# Patient Record
Sex: MALE | Race: WHITE
[De-identification: names, ages, dates, MRNs, and addresses within clinical notes are randomized per-mention and may not be internally consistent; named-entity substitution may affect disease eponyms.]

---

## 2017-12-29 ENCOUNTER — APPOINTMENT (OUTPATIENT)
Dept: ORTHOPEDIC SURGERY | Facility: CLINIC | Age: 47
End: 2017-12-29
Payer: COMMERCIAL

## 2017-12-29 VITALS
HEIGHT: 65 IN | SYSTOLIC BLOOD PRESSURE: 113 MMHG | WEIGHT: 150 LBS | BODY MASS INDEX: 24.99 KG/M2 | DIASTOLIC BLOOD PRESSURE: 72 MMHG | HEART RATE: 58 BPM

## 2017-12-29 DIAGNOSIS — M54.5 LOW BACK PAIN: ICD-10-CM

## 2017-12-29 DIAGNOSIS — M54.9 DORSALGIA, UNSPECIFIED: ICD-10-CM

## 2017-12-29 DIAGNOSIS — M50.20 OTHER CERVICAL DISC DISPLACEMENT, UNSPECIFIED CERVICAL REGION: ICD-10-CM

## 2017-12-29 DIAGNOSIS — Z87.891 PERSONAL HISTORY OF NICOTINE DEPENDENCE: ICD-10-CM

## 2017-12-29 DIAGNOSIS — Z87.39 PERSONAL HISTORY OF OTHER DISEASES OF THE MUSCULOSKELETAL SYSTEM AND CONNECTIVE TISSUE: ICD-10-CM

## 2017-12-29 DIAGNOSIS — Z80.9 FAMILY HISTORY OF MALIGNANT NEOPLASM, UNSPECIFIED: ICD-10-CM

## 2017-12-29 PROCEDURE — 99204 OFFICE O/P NEW MOD 45 MIN: CPT

## 2017-12-29 PROCEDURE — 72170 X-RAY EXAM OF PELVIS: CPT | Mod: 59

## 2017-12-29 PROCEDURE — 72110 X-RAY EXAM L-2 SPINE 4/>VWS: CPT

## 2017-12-29 PROCEDURE — 72070 X-RAY EXAM THORAC SPINE 2VWS: CPT

## 2017-12-29 NOTE — DISCUSSION/SUMMARY
[Medication Risks Reviewed] : Medication risks reviewed [de-identified] : The patient presents with chronic history of low back pain. There appears to be a chronic compression deformity of the vertebral body with degenerative changes at the L4-5 level which is likely contributing to his chronic back pain. He occasionally reports pain radiating to his hips. I discussed the option of getting an MRI lumbar spine but he would like to avoid that for now.\par \par The patient also presents with a more recent acute injury to his thoracolumbar junction when diving off a springboard. Suggestion of avulsion injury of the inferior endplate of L1 which is likely contributing to his symptoms. He's not interested in MRI for further investigation at this time.\par \par I discussed the option of physical therapy but he feels that his copayments are too high and he is not interested in those I recommended that he avoid impact exercises in the lower extremities also bending twisting lifting. I discussed alternative treatments of acupuncture as well. He is not interested in those at the moment. He may try over-the-counter patches and creams.\par \par The patient was educated regarding their condition, treatment options as well as prescribed course of treatment. \par Risks and benefits as well as alternatives to the proposed treatment were also provided to the patient \par They were given the opportunity to have all their questions answered to their satisfaction.\par \par Vital signs were reviewed with the patient and the patient was instructed to followup with their primary care provider for further management.\par \par Healthy lifestyle recommendations were also made including a tobacco free lifestyle, proper diet, and weight control.

## 2017-12-29 NOTE — CONSULT LETTER
[Dear  ___] : Dear  [unfilled], [I had the pleasure of evaluating your patient, [unfilled].] : I had the pleasure of evaluating your patient, [unfilled]. [FreeTextEntry2] : Mert\LILLY Olvera [FreeTextEntry1] : Thank you for this referral. I have enclosed my note for your review. Please feel free to contact my office if you have additional questions regarding this patient.\par \par Regards,\par Shola Barker MD, FACS, FAAOS\par \par  of Orthopaedic Surgery\par [facility] [employer]\par Spinal Reconstruction Surgery\par Minimally Invasive Spinal Surgery\par [facility]

## 2017-12-29 NOTE — HISTORY OF PRESENT ILLNESS
[Prolonged Sitting] : worsened by prolonged sitting [Sitting] : worsened by sitting [Physical Therapy] : relieved by physical therapy [Recumbency] : relieved by recumbency [Rest] : relieved by rest [Joint Pain] : joint pain [Joint Stiffness] : joint stiffness [Sleep Disturbances] : sleep disturbances [All Other ROS Normal] : All other review of systems are negative except as noted [All Hx] : past medical, family, and social [All] : medication and allergy [Pain] : pain [Stable] : stable [___ mths] : [unfilled] month(s) ago [Chills] : no chills [Fever] : no fever [FreeTextEntry1] : Mid to low back pain [FreeTextEntry2] : Patient is here today for evaluation on his mid back pain since diving in a pool 6 months ago over a springboard. Had pain over thoracolumbar junction for a few weeks, no bruising. Improved over time. Not medically treated for this issue. \par Patient also here for evaluation on his low back pain going on for 5 years. Patient states in the past has seen orthopedist for his neck and low back pain. Patient at that time did go for physical therapy for his neck which did help no real neck pain anymore but still gets on and off low back pain. Patient is  and does sit a lot.\par Neck pain ~8 years ago, saw an orthopaedist, MRI at that time, PT at that time.

## 2017-12-29 NOTE — PHYSICAL EXAM
[Poor Appearance] : well-appearing [Acute Distress] : not in acute distress [Obese] : not obese [Abl Mood] : in a normal mood [Abl Affect] : with normal affect [Poor Coordination] : normal coordination [Disorientation] : oriented x 3 [Normal] : normal [Limited] : is limited [Painful] : not painful [SLR] : negative straight leg raise [LE] : Sensory: Intact in bilateral lower extremities [1+] : left patella 1+ [0] : left ankle jerk 0 [Plantar Reflex Right Only] : absent on the right [Plantar Reflex Left Only] : absent on the left [DTR Reflexes Clonus Of Right Ankle (___ Beats)] : absent on the right [DTR Reflexes Clonus Of Left Ankle (___ Beats)] : absent on the left [DP] : dorsalis pedis 2+ and symmetric bilaterally [PT] : posterior tibial 2+ and symmetric bilaterally [FreeTextEntry2] : The pt is awake, alert and oriented to self, place and time, is comfortable and in no acute distress. Gait examination reveals a narrow based, non-ataxic, non-antalgic gait. The pt can heel and toe walk without difficulty. No rashes or ecchymotic lesions noted over the neck, back and lower extremities bilaterally. No obvious abnormal spinal curvature in the sagittal and coronal planes. No tenderness over the cervical, thoracic or lumbar spine, paraspinal or upper and lower extremity musculature. There is no sacroiliac tenderness bilaterally. No tenderness over the greater trochanter bilaterally. No atrophy or abnormal movements noted in the upper or lower extremities bilaterally. No swelling seen in the upper or lower extremities bilaterally. No joint laxity noted in the upper and lower extremity joints bilaterally.\par No cervical lymphadenopathy noted anteriorly. \par Cervical spine range of motion is pain free. Forward flexion of chin to chest, extension 30 degrees, rotation laterally 40 degrees bilaterally. Full range of motion of both shoulders. Negative Spurling's sign bilaterally. There is a negative Neer's sign and Hawkin's sign bilaterally. \par Range of motion of hip is internal rotation 20 degrees,  30° external rotation without pain\par Negative straight leg raise to 60° in the supine position. No groin pain with hip internal rotation, negative LEXIE test bilaterally. There are 2+ DP pulses bilaterally. There is a negative Babinski sign and no clonus bilaterally in the upper or lower extremities. [de-identified] : He can forward flex to his mid tibia and extend 30° with no significant pain [de-identified] : 4 views lumbar spine obtained today demonstrate minimal right-sided thoracolumbar curve. On the lateral projection chronic severe decompression from the scene of the L5 vertebral body with approximately 10% loss of vertebral body height. Degenerative changes are seen with a true osteophyte at the L5 level. Between flex extension no dynamic instability. There is suggestion of avulsion injury of the inferior endplate of L1 vertebral body.\par \par AP and lateral image of the thoracic spine demonstrates no significant scoliosis. No obvious acute fractures. No significant degenerative changes.\par \par AP pelvis demonstrates normal appearance of the hips bilaterally. No acute fractures but no significant degeneration.

## 2024-07-14 ENCOUNTER — EMERGENCY (EMERGENCY)
Facility: HOSPITAL | Age: 54
LOS: 0 days | Discharge: HOME | End: 2024-07-14
Attending: EMERGENCY MEDICINE
Payer: COMMERCIAL

## 2024-07-14 VITALS
TEMPERATURE: 98 F | HEART RATE: 65 BPM | SYSTOLIC BLOOD PRESSURE: 123 MMHG | DIASTOLIC BLOOD PRESSURE: 79 MMHG | RESPIRATION RATE: 16 BRPM | OXYGEN SATURATION: 98 %

## 2024-07-14 DIAGNOSIS — E78.00 PURE HYPERCHOLESTEROLEMIA, UNSPECIFIED: ICD-10-CM

## 2024-07-14 DIAGNOSIS — M54.50 LOW BACK PAIN, UNSPECIFIED: ICD-10-CM

## 2024-07-14 DIAGNOSIS — M48.00 SPINAL STENOSIS, SITE UNSPECIFIED: ICD-10-CM

## 2024-07-14 DIAGNOSIS — M53.3 SACROCOCCYGEAL DISORDERS, NOT ELSEWHERE CLASSIFIED: ICD-10-CM

## 2024-07-14 PROCEDURE — 96372 THER/PROPH/DIAG INJ SC/IM: CPT

## 2024-07-14 PROCEDURE — 99284 EMERGENCY DEPT VISIT MOD MDM: CPT

## 2024-07-14 PROCEDURE — 99283 EMERGENCY DEPT VISIT LOW MDM: CPT | Mod: 25

## 2024-07-14 RX ORDER — PREDNISONE 10 MG/1
40 TABLET ORAL ONCE
Refills: 0 | Status: COMPLETED | OUTPATIENT
Start: 2024-07-14 | End: 2024-07-14

## 2024-07-14 RX ORDER — ACETAMINOPHEN 325 MG
1000 TABLET ORAL ONCE
Refills: 0 | Status: DISCONTINUED | OUTPATIENT
Start: 2024-07-14 | End: 2024-07-14

## 2024-07-14 RX ORDER — KETOROLAC TROMETHAMINE 30 MG/ML
30 INJECTION, SOLUTION INTRAMUSCULAR ONCE
Refills: 0 | Status: DISCONTINUED | OUTPATIENT
Start: 2024-07-14 | End: 2024-07-14

## 2024-07-14 RX ORDER — PREDNISONE 10 MG/1
2 TABLET ORAL
Qty: 8 | Refills: 0
Start: 2024-07-14 | End: 2024-07-17

## 2024-07-14 RX ORDER — LIDOCAINE HCL 28 MG/G
1 GEL TOPICAL ONCE
Refills: 0 | Status: COMPLETED | OUTPATIENT
Start: 2024-07-14 | End: 2024-07-14

## 2024-07-14 RX ADMIN — PREDNISONE 40 MILLIGRAM(S): 10 TABLET ORAL at 10:56

## 2024-07-14 RX ADMIN — LIDOCAINE HCL 1 PATCH: 28 GEL TOPICAL at 10:56

## 2024-07-14 RX ADMIN — KETOROLAC TROMETHAMINE 30 MILLIGRAM(S): 30 INJECTION, SOLUTION INTRAMUSCULAR at 10:56

## 2024-07-14 RX ADMIN — Medication 10 MILLIGRAM(S): at 10:56

## 2024-07-22 ENCOUNTER — APPOINTMENT (OUTPATIENT)
Age: 54
End: 2024-07-22
Payer: COMMERCIAL

## 2024-07-22 ENCOUNTER — TRANSCRIPTION ENCOUNTER (OUTPATIENT)
Age: 54
End: 2024-07-22

## 2024-07-22 ENCOUNTER — APPOINTMENT (OUTPATIENT)
Dept: RADIOLOGY | Facility: CLINIC | Age: 54
End: 2024-07-22
Payer: COMMERCIAL

## 2024-07-22 VITALS
OXYGEN SATURATION: 97 % | HEART RATE: 81 BPM | WEIGHT: 160 LBS | DIASTOLIC BLOOD PRESSURE: 75 MMHG | SYSTOLIC BLOOD PRESSURE: 115 MMHG | BODY MASS INDEX: 26.66 KG/M2 | HEIGHT: 65 IN

## 2024-07-22 DIAGNOSIS — M54.50 LOW BACK PAIN, UNSPECIFIED: ICD-10-CM

## 2024-07-22 DIAGNOSIS — M25.551 PAIN IN RIGHT HIP: ICD-10-CM

## 2024-07-22 DIAGNOSIS — M54.10 RADICULOPATHY, SITE UNSPECIFIED: ICD-10-CM

## 2024-07-22 PROCEDURE — 72110 X-RAY EXAM L-2 SPINE 4/>VWS: CPT | Mod: 26

## 2024-07-22 PROCEDURE — 73502 X-RAY EXAM HIP UNI 2-3 VIEWS: CPT | Mod: 26,RT

## 2024-07-22 PROCEDURE — 99203 OFFICE O/P NEW LOW 30 MIN: CPT

## 2024-07-23 PROBLEM — M54.50 LOW BACK PAIN: Status: ACTIVE | Noted: 2017-12-29

## 2024-07-23 RX ORDER — CYCLOBENZAPRINE HYDROCHLORIDE 5 MG/1
5 TABLET, FILM COATED ORAL 3 TIMES DAILY
Qty: 30 | Refills: 0 | Status: ACTIVE | COMMUNITY
Start: 2024-07-23 | End: 1900-01-01

## 2024-07-23 NOTE — CONSULT LETTER
[Dear  ___] : Dear  [unfilled], [Courtesy Letter:] : I had the pleasure of seeing your patient, [unfilled], in my office today. [Sincerely,] : Sincerely, [FreeTextEntry1] : Zack Geller is a 54-year-old  who presents today with lumbar symptoms.  Patient reports symptoms started approximately 20 years ago without any specific event.  Patient reports an intermittent ache to the right side of his lower back region.  He reports he also experiences a shooting pain which radiates from the right side of his lower back into his right hip.  He occasionally experiences a right groin ache.  Patient reports at times he will experience an ache radiating down into his right leg.  He is unsure of the specific distribution.  He reports numbness and tingling to the left lateral hip and thigh and right lateral leg.  He denies lower extremity weakness.  Denies saddle anesthesia or bowel bladder dysfunction.  He reports initially he was having difficulties bearing weight on his right leg however this has improved.  Patient was evaluated at the emergency room on 7/14/2024.  He was prescribed prednisone and Flexeril.  Patient reports Advil provides him with some benefit.  He has attempted Tylenol.  He had underwent physical therapy for 2 months approximately 1 year ago.  He reports stretching provides him with some benefit.  Patient is alert and oriented.  No distress noted.  Negative Eileen's.  Negative clonus.  Patient is able to perform heel and toe raises without difficulties.  He is able to tandem walk without difficulties.  No difficulties noted with normal walking.  Strength to bilateral upper and lower extremities 5/5.  Diminished upper extremity reflexes.  2+ bilateral patellar reflexes.  Unable to elicit bilateral Achilles tendon reflexes.  I provided the patient with a prescription for an x-ray of his right hip and lumbar spine.  Provided patient with prescription for MRI of the lumbar spine.  Provided patient with referral for pain management.  Provided patient with refill for Flexeril.  Patient aware not to drive or fly while on medication.  Risks and SE reviewed with patient.  Patient verbalizes understanding and risks.  Patient will follow-up in office to review images with neurosurgeon.  Patient aware to call with any further questions or concerns or with any new or worsening symptoms. [FreeTextEntry3] : Lainey Herring, MSN, [facility]-BC Nurse Practitioner Neurosurgery [address], [address] [place] [address]  Office: [phone]  Fax: [phone]

## 2024-07-23 NOTE — CONSULT LETTER
[Dear  ___] : Dear  [unfilled], [Courtesy Letter:] : I had the pleasure of seeing your patient, [unfilled], in my office today. [Sincerely,] : Sincerely, [FreeTextEntry1] : Zack Geller is a 54-year-old  who presents today with lumbar symptoms.  Patient reports symptoms started approximately 20 years ago without any specific event.  Patient reports an intermittent ache to the right side of his lower back region.  He reports he also experiences a shooting pain which radiates from the right side of his lower back into his right hip.  He occasionally experiences a right groin ache.  Patient reports at times he will experience an ache radiating down into his right leg.  He is unsure of the specific distribution.  He reports numbness and tingling to the left lateral hip and thigh and right lateral leg.  He denies lower extremity weakness.  Denies saddle anesthesia or bowel bladder dysfunction.  He reports initially he was having difficulties bearing weight on his right leg however this has improved.  Patient was evaluated at the emergency room on 7/14/2024.  He was prescribed prednisone and Flexeril.  Patient reports Advil provides him with some benefit.  He has attempted Tylenol.  He had underwent physical therapy for 2 months approximately 1 year ago.  He reports stretching provides him with some benefit.  Patient is alert and oriented.  No distress noted.  Negative Eileen's.  Negative clonus.  Patient is able to perform heel and toe raises without difficulties.  He is able to tandem walk without difficulties.  No difficulties noted with normal walking.  Strength to bilateral upper and lower extremities 5/5.  Diminished upper extremity reflexes.  2+ bilateral patellar reflexes.  Unable to elicit bilateral Achilles tendon reflexes.  I provided the patient with a prescription for an x-ray of his right hip and lumbar spine.  Provided patient with prescription for MRI of the lumbar spine.  Provided patient with referral for pain management.  Provided patient with refill for Flexeril.  Patient aware not to drive or fly while on medication.  Risks and SE reviewed with patient.  Patient verbalizes understanding and risks.  Patient will follow-up in office to review images with neurosurgeon.  Patient aware to call with any further questions or concerns or with any new or worsening symptoms. [FreeTextEntry3] : Lainey Herring, MSN, [facility]-BC Nurse Practitioner Neurosurgery [address], [address] [place] [address]  Office: [phone]  Fax: [phone]

## 2024-07-30 ENCOUNTER — OUTPATIENT (OUTPATIENT)
Dept: OUTPATIENT SERVICES | Facility: HOSPITAL | Age: 54
LOS: 1 days | End: 2024-07-30
Payer: COMMERCIAL

## 2024-07-30 ENCOUNTER — APPOINTMENT (OUTPATIENT)
Dept: MRI IMAGING | Facility: CLINIC | Age: 54
End: 2024-07-30

## 2024-07-30 DIAGNOSIS — M54.50 LOW BACK PAIN, UNSPECIFIED: ICD-10-CM

## 2024-07-30 DIAGNOSIS — M25.551 PAIN IN RIGHT HIP: ICD-10-CM

## 2024-07-30 DIAGNOSIS — Z00.8 ENCOUNTER FOR OTHER GENERAL EXAMINATION: ICD-10-CM

## 2024-07-30 DIAGNOSIS — M54.10 RADICULOPATHY, SITE UNSPECIFIED: ICD-10-CM

## 2024-07-30 PROCEDURE — 72148 MRI LUMBAR SPINE W/O DYE: CPT | Mod: 26

## 2024-07-30 PROCEDURE — 72148 MRI LUMBAR SPINE W/O DYE: CPT

## 2024-08-06 ENCOUNTER — APPOINTMENT (OUTPATIENT)
Dept: PHYSICAL MEDICINE AND REHAB | Facility: CLINIC | Age: 54
End: 2024-08-06

## 2024-08-06 PROBLEM — M53.3 SACROILIAC JOINT PAIN: Status: ACTIVE | Noted: 2024-08-06

## 2024-08-06 PROCEDURE — G2211 COMPLEX E/M VISIT ADD ON: CPT | Mod: NC

## 2024-08-06 PROCEDURE — 99204 OFFICE O/P NEW MOD 45 MIN: CPT | Mod: GC

## 2024-08-06 NOTE — DATA REVIEWED
[FreeTextEntry1] : EXAM: [id number] - MR SPINE LUMBAR  - ORDERED BY: CHASTITY TANNER   PROCEDURE DATE:  07/30/2024    INTERPRETATION:  CLINICAL INFORMATION: Lower back pain  ADDITIONAL CLINICAL INFORMATION: Low back muscle strain S39.012A  TECHNIQUE: Multiplanar, multisequence MRI was performed of the lumbar spine. IV Contrast: NONE  PRIOR STUDIES: Radiographs dated 7/22/2024  FINDINGS:  LOCALIZER: No additional findings. BONES: 5 lumbar vertebral bodies are assumed. No acute fracture. ALIGNMENT: Dextrocurvature of the lumbar spine. No significant spondylolisthesis. DISCS: Mild degenerative disc disease at L3-L4, L4-L5, and L5-S1. SACROILIAC JOINTS/SACRUM: Mild degenerative changes of the superior sacroiliac joints. CONUS AND CAUDA EQUINA: Conus terminates at L1-L2. VISUALIZED INTRAPELVIC/INTRA-ABDOMINAL SOFT TISSUES: Unremarkable. PARASPINAL SOFT TISSUES: Unremarkable.  INDIVIDUAL LEVELS:  T12-L1: No canal or neuroforaminal stenosis. L1-L2: No canal or neuroforaminal stenosis. L2-L3: Small bilateral foraminal disc protrusions. No canal or neuroforaminal stenosis. L3-L4: Small disc bulge. Mild bilateral neural foraminal narrowing. No central canal stenosis. L4-L5: Disc bulge with posterior osseous ridging asymmetric to the left. Minimal bilateral facet arthrosis. Mild bilateral neural foraminal narrowing. No central canal stenosis. L5-S1: Disc bulge with posterior osseous ridging and superimposed central disc protrusion. Minimal bilateral facet arthrosis. Mild bilateral neural foraminal narrowing. No central canal stenosis.  IMPRESSION:  Multilevel degenerative changes throughout the lumbar spine, as detailed above. No significant central canal narrowing at any level. Up to mild multilevel neural foraminal narrowing, as above.  --- End of Report ---       JOSSIE HOPKINS M.D., ATTENDING RADIOLOGIST This document has been electronically signed. Aug  1 2024  8:16PM  EXAM: [id number] - XR LS SPINE FLEX EXT MIN 4V  - ORDERED BY: CHASTITY TANNER  EXAM: [id number] - XR HIP 2-3V RT  - ORDERED BY: CHASTITY TANNER   PROCEDURE DATE:  07/22/2024    INTERPRETATION:  CLINICAL INDICATION: lower back and right hip pain  EXAM: Upright AP neutral flexion extension lateral lumbosacral spine and AP frog-lateral right hip from 7/22/2024 at 1451. No similar prior studies available for comparison.  IMPRESSION: No compression fractures, spondylolistheses, spondylolysis defects, or evidence for dynamic instability.  Narrowed L4-L5 and to lesser extent L3-L4 disc spaces. Preserved remaining intervertebral disc spaces.  No right hip fracture or dislocation. Symmetrically aligned and spaced SI joints and pubic symphysis. Preserved right hip joint space and no gross radiographic evidence for AVN. Small nodular foci proximal hamstring calcific tendinosis adjacent to right ischial tuberosity margins.  No lytic or blastic lesions.  --- End of Report ---       KARI BOB MD; Attending Radiologist This document has been electronically signed. Jul 26 2024  2:07PM

## 2024-08-06 NOTE — HISTORY OF PRESENT ILLNESS
[FreeTextEntry1] : Mr. GLIDA VENEGAS is a 54-year-old male  who presents with chronic right sided low back pain.   Location: right sided low back pain Onset: Pain started intermittently over 5 years ago but within the past 2-3 years evolved into constant pain and exacerbated 2 months ago requiring emergency room visit Provocation/Palliative: Worse with activity, better with rest and lying down, sitting for long periods of time, standing for long periods of time, has since stopped working out and running;  Quality: Aching pain with intermittent sharp pains depending on position/activity Radiation: to right lateral hip and into the right groin  Severity: 4/10 constantly but can increase to 7/10 after sitting for long periods of time  Timing: constant   Denies any associated numbness. Denies any associated leg weakness. Denies any loss of bowel/bladder control or any groin numbness. Previous medications trialed: Advil, prednisone, flexeril Previous procedures relevant to complaint: nothing  Conservative therapy tried?: physical therapy with mild improvement

## 2024-08-06 NOTE — HISTORY OF PRESENT ILLNESS
[FreeTextEntry1] : Mr. GILDA VENEGAS is a 54-year-old male  who presents with chronic right sided low back pain.   Location: right sided low back pain Onset: Pain started intermittently over 5 years ago but within the past 2-3 years evolved into constant pain and exacerbated 2 months ago requiring emergency room visit Provocation/Palliative: Worse with activity, better with rest and lying down, sitting for long periods of time, standing for long periods of time, has since stopped working out and running;  Quality: Aching pain with intermittent sharp pains depending on position/activity Radiation: to right lateral hip and into the right groin  Severity: 4/10 constantly but can increase to 7/10 after sitting for long periods of time  Timing: constant   Denies any associated numbness. Denies any associated leg weakness. Denies any loss of bowel/bladder control or any groin numbness. Previous medications trialed: Advil, prednisone, flexeril Previous procedures relevant to complaint: nothing  Conservative therapy tried?: physical therapy with mild improvement

## 2024-08-06 NOTE — ASSESSMENT
[FreeTextEntry1] : Mr. GILDA VENEGAS is a 54 year old male who presents with chronic right sided low back pain likely secondary to SI joint related pain. Denies any red flag signs. Recommend at this time: -MRI L spine reviewed -initiate HEP for SI joint pain - Discussed with patient the risks (including but not limited to bleeding, elevated blood sugar, allergic reaction, infection, nerve damage, etc), benefits and alternatives to a R sacroiliac joint steroid injection for which patient understands and would like to proceed.   Return for procedure. Patient aware of red flag signs including any changes to their bowel/bladder control, groin numbness or new weakness. Patient knows to seek immediate attention by calling 911 or going to nearest ER if these symptoms appear.   This patient is being managed for a complex chronic pain that requires ongoing medical management. The nature of this condition requires a longitudinal relationship and monitoring over time for appropriate treatment.

## 2024-08-06 NOTE — PHYSICAL EXAM
[FreeTextEntry1] : PE: Constitutional: In NAD, calm and cooperative MSK (Back)  Inspection: no gross swelling identified  Palpation: No tenderness of the bilateral lower lumbar paraspinals, TTP over R SI joint  ROM: No pain at end lumbar extension/flexion  Strength: 5/5 strength in bilateral lower extremities  Reflexes: 2+ Patella reflex bilaterally, 2+ Achilles reflex bilaterally, negative clonus bilaterally  Sensation: Intact to light touch in bilateral lower extremities Special tests: SLR: negative bilaterally LEXIE: positive right side FADIR: negative bilaterally Facet loading: positive right side SI compression: positive right side Gaenslen Test: positive to right side

## 2024-08-06 NOTE — DATA REVIEWED
[FreeTextEntry1] : EXAM: [id number] - MR SPINE LUMBAR  - ORDERED BY: CHASTITY TANNER   PROCEDURE DATE:  07/30/2024    INTERPRETATION:  CLINICAL INFORMATION: Lower back pain  ADDITIONAL CLINICAL INFORMATION: Low back muscle strain S39.012A  TECHNIQUE: Multiplanar, multisequence MRI was performed of the lumbar spine. IV Contrast: NONE  PRIOR STUDIES: Radiographs dated 7/22/2024  FINDINGS:  LOCALIZER: No additional findings. BONES: 5 lumbar vertebral bodies are assumed. No acute fracture. ALIGNMENT: Dextrocurvature of the lumbar spine. No significant spondylolisthesis. DISCS: Mild degenerative disc disease at L3-L4, L4-L5, and L5-S1. SACROILIAC JOINTS/SACRUM: Mild degenerative changes of the superior sacroiliac joints. CONUS AND CAUDA EQUINA: Conus terminates at L1-L2. VISUALIZED INTRAPELVIC/INTRA-ABDOMINAL SOFT TISSUES: Unremarkable. PARASPINAL SOFT TISSUES: Unremarkable.  INDIVIDUAL LEVELS:  T12-L1: No canal or neuroforaminal stenosis. L1-L2: No canal or neuroforaminal stenosis. L2-L3: Small bilateral foraminal disc protrusions. No canal or neuroforaminal stenosis. L3-L4: Small disc bulge. Mild bilateral neural foraminal narrowing. No central canal stenosis. L4-L5: Disc bulge with posterior osseous ridging asymmetric to the left. Minimal bilateral facet arthrosis. Mild bilateral neural foraminal narrowing. No central canal stenosis. L5-S1: Disc bulge with posterior osseous ridging and superimposed central disc protrusion. Minimal bilateral facet arthrosis. Mild bilateral neural foraminal narrowing. No central canal stenosis.  IMPRESSION:  Multilevel degenerative changes throughout the lumbar spine, as detailed above. No significant central canal narrowing at any level. Up to mild multilevel neural foraminal narrowing, as above.  --- End of Report ---       JOSSIE HOPKINS M.D., ATTENDING RADIOLOGIST This document has been electronically signed. Aug  1 2024  8:16PM  EXAM: [id number] - XR LS SPINE FLEX EXT MIN 4V  - ORDERED BY: CHASTITY TANNER  EXAM: [id number] - XR HIP 2-3V RT  - ORDERED BY: CHASTITY TANNER   PROCEDURE DATE:  07/22/2024    INTERPRETATION:  CLINICAL INDICATION: lower back and right hip pain  EXAM: Upright AP neutral flexion extension lateral lumbosacral spine and AP frog-lateral right hip from 7/22/2024 at 1451. No similar prior studies available for comparison.  IMPRESSION: No compression fractures, spondylolistheses, spondylolysis defects, or evidence for dynamic instability.  Narrowed L4-L5 and to lesser extent L3-L4 disc spaces. Preserved remaining intervertebral disc spaces.  No right hip fracture or dislocation. Symmetrically aligned and spaced SI joints and pubic symphysis. Preserved right hip joint space and no gross radiographic evidence for AVN. Small nodular foci proximal hamstring calcific tendinosis adjacent to right ischial tuberosity margins.  No lytic or blastic lesions.  --- End of Report ---       KARI BOB MD; Attending Radiologist This document has been electronically signed. Jul 26 2024  2:07PM

## 2024-08-19 ENCOUNTER — APPOINTMENT (OUTPATIENT)
Dept: NEUROSURGERY | Facility: CLINIC | Age: 54
End: 2024-08-19
Payer: COMMERCIAL

## 2024-08-19 VITALS
HEART RATE: 74 BPM | OXYGEN SATURATION: 98 % | WEIGHT: 160 LBS | BODY MASS INDEX: 25.71 KG/M2 | SYSTOLIC BLOOD PRESSURE: 130 MMHG | DIASTOLIC BLOOD PRESSURE: 60 MMHG | HEIGHT: 66 IN

## 2024-08-19 PROCEDURE — 99205 OFFICE O/P NEW HI 60 MIN: CPT

## 2024-08-19 NOTE — CONSULT LETTER
[Dear  ___] : Dear  [unfilled], [Courtesy Letter:] : I had the pleasure of seeing your patient, [unfilled], in my office today. [Sincerely,] : Sincerely, [FreeTextEntry1] : Zack is a very pleasant 54-year-old male patient who was seen in our office today regarding low back pain.  The patient was organized advanced imaging which was reviewed today.  The patient endorses a longstanding history of low back pain dating back many years.  However, the patient's pain has progressively worsened in terms of severity and frequency.  The patient endorses several types of pain.  The patient's chronic and constant pain resides slightly left and to the right of midline in the lower lumbar spine.  This pain is getting worse with prolonged sitting and standing and is somewhat better with movements.  The patient also endorses severe excruciating pain which is intermittent.  These episodes are usually associated with an unusual movement or activity.  The symptoms generally resolve spontaneously with time though they can be debilitating.  The patient presented with this type of pain radiating from the back into the right hip at his last visit.  The patient remains very active but, by his own admission, he is quite guarded with his low back.  The patient does not experience pain with recumbency. The patient has attempted physical therapy in the past but no stretching exercises were provided for the lumbar spine.  On review of systems, the patient does endorse right-sided leg pain with sitting too long which resolved spontaneously.   On examination, the patient is alert, oriented, and compliant with the exam.  The patient demonstrates full strength in the upper and lower extremities bilaterally.  The patient is quite limited in terms of lumbar range of motion and instead uses his hips to accommodate exercises.  The patient does not endorse any point tenderness.  The patient ambulates well but has mild difficulty standing from a seated position.  The patient is accompanied with an MRI scan of the lumbar spine dated July 30, 2024.  These images demonstrated disc height loss most notably from L3-S1.  Foraminal stenosis is noted bilaterally but without overt nerve root compression.  No significant lateral recess stenosis is noted.  Minimal movement is noted on flexion/extension x-rays.  Lumbar lordosis from L1-5 with flexion is 21 degrees whereas lumbar lordosis from L1-5 with extension is 27 degrees.  No significant scoliosis formally is noted.  Taken together, the patient has a clinical history and radiographic findings most consistent with chronic low back pain with acute exacerbation secondary to muscle spasms.  The patient has been recommended physical therapy with specific emphasis on stretching exercises of the lumbar spine to help with his muscle spasms.  The patient is chronic and constant pain in the midline is less clear.  Based on his description, both facet and muscular causes remain on the differential.  The patient was seen by pain management previously but this was in the context of severe right-sided low back pain hip region.  This has since resolved.  At this time, for the patient's chronic low back pain, I have recommended physical therapy and possibly medial branch blocks or trigger point injections for arthritic and muscular causes respectively.  The patient will be following up with us in approximately 3 months to reevaluate his progress and look forward to seeing him back at that time. [FreeTextEntry3] : Jcarlos Riddle MD, PhD, FRCPSC  Attending Neurosurgeon  [address], [address]nd [place] [address]  Office: [phone]  Fax: [phone]

## 2024-08-19 NOTE — CONSULT LETTER
[Dear  ___] : Dear  [unfilled], [Courtesy Letter:] : I had the pleasure of seeing your patient, [unfilled], in my office today. [Sincerely,] : Sincerely, [FreeTextEntry1] : Zack is a very pleasant 54-year-old male patient who was seen in our office today regarding low back pain.  The patient was organized advanced imaging which was reviewed today.  The patient endorses a longstanding history of low back pain dating back many years.  However, the patient's pain has progressively worsened in terms of severity and frequency.  The patient endorses several types of pain.  The patient's chronic and constant pain resides slightly left and to the right of midline in the lower lumbar spine.  This pain is getting worse with prolonged sitting and standing and is somewhat better with movements.  The patient also endorses severe excruciating pain which is intermittent.  These episodes are usually associated with an unusual movement or activity.  The symptoms generally resolve spontaneously with time though they can be debilitating.  The patient presented with this type of pain radiating from the back into the right hip at his last visit.  The patient remains very active but, by his own admission, he is quite guarded with his low back.  The patient does not experience pain with recumbency. The patient has attempted physical therapy in the past but no stretching exercises were provided for the lumbar spine.  On review of systems, the patient does endorse right-sided leg pain with sitting too long which resolved spontaneously.   On examination, the patient is alert, oriented, and compliant with the exam.  The patient demonstrates full strength in the upper and lower extremities bilaterally.  The patient is quite limited in terms of lumbar range of motion and instead uses his hips to accommodate exercises.  The patient does not endorse any point tenderness.  The patient ambulates well but has mild difficulty standing from a seated position.  The patient is accompanied with an MRI scan of the lumbar spine dated July 30, 2024.  These images demonstrated disc height loss most notably from L3-S1.  Foraminal stenosis is noted bilaterally but without overt nerve root compression.  No significant lateral recess stenosis is noted.  Minimal movement is noted on flexion/extension x-rays.  Lumbar lordosis from L1-5 with flexion is 21 degrees whereas lumbar lordosis from L1-5 with extension is 27 degrees.  No significant scoliosis formally is noted.  Taken together, the patient has a clinical history and radiographic findings most consistent with chronic low back pain with acute exacerbation secondary to muscle spasms.  The patient has been recommended physical therapy with specific emphasis on stretching exercises of the lumbar spine to help with his muscle spasms.  The patient is chronic and constant pain in the midline is less clear.  Based on his description, both facet and muscular causes remain on the differential.  The patient was seen by pain management previously but this was in the context of severe right-sided low back pain hip region.  This has since resolved.  At this time, for the patient's chronic low back pain, I have recommended physical therapy and possibly medial branch blocks or trigger point injections for arthritic and muscular causes respectively.  The patient will be following up with us in approximately 3 months to reevaluate his progress and look forward to seeing him back at that time. [FreeTextEntry3] : Jcarlos Riddle MD, PhD, FRCPSC  Attending Neurosurgeon  [address], [address]nd [place] [address]  Office: [phone]  Fax: [phone]

## 2024-08-21 ENCOUNTER — APPOINTMENT (OUTPATIENT)
Dept: PHYSICAL MEDICINE AND REHAB | Facility: CLINIC | Age: 54
End: 2024-08-21

## 2024-08-21 NOTE — HISTORY OF PRESENT ILLNESS
[FreeTextEntry1] :  This visit was conducted via an audiovisual call. Patient confirmed they were at home at [address] [place] [address], [place] [address] . Dr. Ricci was in  the office at [address] [place] [address]. Patient consented to the televisit.   Mr. GILDA VENEGAS is a 54 year old male who presents for follow up. At last visit, he was ordered a R SI joint injection. His injection was denied by his insurance. He was also seen by Dr. Riddle of neurosurgery who says his pain is likely muscular or facetogenic.    Location: right sided low back pain Onset: Pain started intermittently over 5 years ago but within the past 2-3 years evolved into constant pain and exacerbated 2 months ago requiring emergency room visit Provocation/Palliative: Worse with activity, better with rest and lying down, sitting for long periods of time, standing for long periods of time, has since stopped working out and running; Quality: Aching pain with intermittent sharp pains depending on position/activity Radiation: to right lateral hip and into the right groin Severity: 4/10 constantly but can increase to 7/10 after sitting for long periods of time Timing: constant  No bowel/bladder changes. No groin numbness.

## 2024-08-23 ENCOUNTER — APPOINTMENT (OUTPATIENT)
Dept: PHYSICAL MEDICINE AND REHAB | Facility: CLINIC | Age: 54
End: 2024-08-23
Payer: COMMERCIAL

## 2024-08-23 DIAGNOSIS — M25.551 PAIN IN RIGHT HIP: ICD-10-CM

## 2024-08-23 DIAGNOSIS — M79.18 MYALGIA, OTHER SITE: ICD-10-CM

## 2024-08-23 DIAGNOSIS — M54.50 LOW BACK PAIN, UNSPECIFIED: ICD-10-CM

## 2024-08-23 PROCEDURE — 99214 OFFICE O/P EST MOD 30 MIN: CPT | Mod: 95

## 2024-08-23 NOTE — ASSESSMENT
[FreeTextEntry1] : Mr. GILDA VENEGAS is a 54 year old male who presents with chronic right sided low back pain likely secondary to SI joint related pain and myofascial based pain. Although his pain is now multifactorial, I do believe his SI joint to be a primary pain generator.  Denies any red flag signs. Recommend at this time: -MRI L spine reviewed - Continue HEP - Again discussed with patient the risks (including but not limited to bleeding, elevated blood sugar, allergic reaction, infection, nerve damage, etc), benefits and alternatives to a R sacroiliac joint steroid injection for which patient understands and would like to proceed.  Return for procedure. Patient aware of red flag signs including any changes to their bowel/bladder control, groin numbness or new weakness. Patient knows to seek immediate attention by calling 911 or going to nearest ER if these symptoms appear.  This patient is being managed for a complex chronic pain that requires ongoing medical management. The nature of this condition requires a longitudinal relationship and monitoring over time for appropriate treatment.

## 2024-08-27 ENCOUNTER — APPOINTMENT (OUTPATIENT)
Dept: PHYSICAL MEDICINE AND REHAB | Facility: AMBULATORY MEDICAL SERVICES | Age: 54
End: 2024-08-27
Payer: COMMERCIAL

## 2024-08-27 ENCOUNTER — APPOINTMENT (OUTPATIENT)
Dept: PHYSICAL MEDICINE AND REHAB | Facility: AMBULATORY MEDICAL SERVICES | Age: 54
End: 2024-08-27

## 2024-08-27 DIAGNOSIS — M53.3 SACROCOCCYGEAL DISORDERS, NOT ELSEWHERE CLASSIFIED: ICD-10-CM

## 2024-08-27 PROCEDURE — 27096 INJECT SACROILIAC JOINT: CPT | Mod: RT

## 2024-08-29 NOTE — PROCEDURE
[de-identified] : PROCEDURE NOTE: Sacroiliac Joint Injection    ATTENDING PHYSICIAN: Adis Ricci DO   PREOPERATIVE DIAGNOSIS:  Sacroiliac Joint Dysfunction POSTOPERATIVE DIAGNOSIS: Same   PROCEDURE PERFORMED: Right Sacroiliac joint injection with fluoroscopic guidance   ANESTHESIA: As per Anesthesia  ESTIMATED BLOOD LOSS:  None FLUOROSCOPY WAS USED.     PROCEDURE AND FINDINGS:   Patient was greeted in the pre-procedure holding area. The risk, benefits and alternatives to the procedure were again reviewed with the patient and written informed consent was placed in the chart. Patient was taken to the procedure room and positioned prone on the fluoroscopy table.  Prior to the procedure a time out was completed, verifying correct patient, procedure, and site.     The skin was prepped with chlorhexidine and draped in the usual sterile fashion.  A  film was taken to identify the right sacroiliac joint and the inferior most intersection of the anterior and posterior sacroiliac joint lines.The overlying skin and subcutaneous tissues were anesthetized using a 27-gauge 1-1/2 inch needle with 1% preservative-free lidocaine for a total volume of 3 mls. A 22-gauge 3.5" Quincke spinal needle was advanced into the sacroiliac joint space under intermittent fluoroscopic guidance. Needle position was confirmed on both AP and lateral views.    Then, after negative aspiration, 1mls of contrast was injected which confirmed adequate intraarticular spread. There was no evidence of intravascular uptake. At this point, after negative aspiration, a total of 2mL volume of treatment injectate, consisting of 1mL of Triamcinolone 40mg/mL and 1mL of Lidocaine 1% was injected easily.  The needle was then flushed with 1 ml of Lidocaine 1% and removed. The needle insertion site was dressed appropriately.   The patient was taken to the recovery room where they were monitored for a brief period of time. Patient tolerated the procedure well and was discharged home in stable condition with post procedural instructions. Patient was advised to follow up in clinic in 1-2 weeks.

## 2024-09-03 ENCOUNTER — NON-APPOINTMENT (OUTPATIENT)
Age: 54
End: 2024-09-03

## 2024-09-09 ENCOUNTER — APPOINTMENT (OUTPATIENT)
Dept: PHYSICAL MEDICINE AND REHAB | Facility: CLINIC | Age: 54
End: 2024-09-09
Payer: COMMERCIAL

## 2024-09-09 VITALS
RESPIRATION RATE: 15 BRPM | BODY MASS INDEX: 25.71 KG/M2 | WEIGHT: 160 LBS | SYSTOLIC BLOOD PRESSURE: 110 MMHG | DIASTOLIC BLOOD PRESSURE: 82 MMHG | HEIGHT: 66 IN

## 2024-09-09 DIAGNOSIS — M53.3 SACROCOCCYGEAL DISORDERS, NOT ELSEWHERE CLASSIFIED: ICD-10-CM

## 2024-09-09 DIAGNOSIS — M25.551 PAIN IN RIGHT HIP: ICD-10-CM

## 2024-09-09 DIAGNOSIS — M54.50 LOW BACK PAIN, UNSPECIFIED: ICD-10-CM

## 2024-09-09 PROCEDURE — G2211 COMPLEX E/M VISIT ADD ON: CPT | Mod: NC

## 2024-09-09 PROCEDURE — 99213 OFFICE O/P EST LOW 20 MIN: CPT

## 2024-09-09 NOTE — ASSESSMENT
[FreeTextEntry1] : Mr. GILDA VENEGAS is a 54 year old male who presents with chronic right sided low back pain likely secondary to SI joint related pain and myofascial based pain. Although his pain is now multifactorial, I do believe his SI joint to be a primary pain generator. He has been getting some relief from the injection. Denies any red flag signs. Recommend at this time: - Continue HEP - He will start PT 2-3x/week for stretching, strengthening (especially of core muscles), ROM exercises, HEP and modalities PRN including myofascial release, moist heat  - He will take occasional Advil/Tylenol if needed, aware of R/B/A of medications.   Return for procedure. Patient aware of red flag signs including any changes to their bowel/bladder control, groin numbness or new weakness. Patient knows to seek immediate attention by calling 911 or going to nearest ER if these symptoms appear.  This patient is being managed for a complex chronic pain that requires ongoing medical management. The nature of this condition requires a longitudinal relationship and monitoring over time for appropriate treatment.

## 2024-09-09 NOTE — HISTORY OF PRESENT ILLNESS
[FreeTextEntry1] : Mr. GILDA VENEGAS is a 54 year old male who presents for follow up. At last visit, he was ordered a R SI joint injection. He says he developed some R anterior thigh pain after the injection, improving, worst with prolonged sitting. Denies any associated numbness/tingling. He has noticed some increased ROM with the injection. His pain is somewhat better since the injection. He is doing a HEP. He is not taking anything for pain.    Location: right sided low back pain Onset: Pain started intermittently over 5 years ago but within the past 2-3 years evolved into constant pain and exacerbated requiring emergency room visit Provocation/Palliative: Worse with activity, better with rest and lying down, sitting for long periods of time, standing for long periods of time, has since stopped working out and running; Quality: Aching pain with intermittent sharp pains depending on position/activity Radiation: to right lateral/anterior hip and into the right groin at times Severity: 1/10 constantly but can increase to 4-5/10 after sitting for long periods of time Timing: constant  No bowel/bladder changes. No groin numbness.

## 2024-09-09 NOTE — PHYSICAL EXAM
[FreeTextEntry1] : PE: Constitutional: In NAD, calm and cooperative MSK (Back)  Inspection: no gross swelling identified, no erythema or swelling around injection site  Palpation: No tenderness of the bilateral lower lumbar paraspinals, No TTP over R SI joint  ROM: No pain at end lumbar extension/flexion  Strength: 5/5 strength in bilateral lower extremities  Reflexes: 2+ Patella reflex bilaterally, 2+ Achilles reflex bilaterally, negative clonus bilaterally  Sensation: Intact to light touch in bilateral lower extremities Special tests: SLR: negative bilaterally LEXIE: negative right side FADIR: negative right side Facet loading: negative right side

## 2024-10-10 ENCOUNTER — APPOINTMENT (OUTPATIENT)
Dept: PHYSICAL MEDICINE AND REHAB | Facility: CLINIC | Age: 54
End: 2024-10-10

## 2024-10-10 ENCOUNTER — TRANSCRIPTION ENCOUNTER (OUTPATIENT)
Age: 54
End: 2024-10-10

## 2024-11-19 ENCOUNTER — APPOINTMENT (OUTPATIENT)
Dept: NEUROSURGERY | Facility: CLINIC | Age: 54
End: 2024-11-19

## 2025-03-16 NOTE — HISTORY OF PRESENT ILLNESS
[FreeTextEntry1] :  This visit was conducted via an audiovisual call. Patient confirmed they were at home at [address] [place] [address], [place] [address] . Dr. Ricci was in  the office at [address] [place] [address]. Patient consented to the televisit.   Mr. GILDA VENEGAS is a 54 year old male who presents for follow up. At last visit, he was ordered a R SI joint injection. His injection was denied by his insurance. He was also seen by Dr. Riddle of neurosurgery who says his pain is likely muscular or facetogenic. He still says his R lower back still hurts him. He does complain of muscle-like pain.    Location: right sided low back pain Onset: Pain started intermittently over 5 years ago but within the past 2-3 years evolved into constant pain and exacerbated 2 months ago requiring emergency room visit Provocation/Palliative: Worse with activity, better with rest and lying down, sitting for long periods of time, standing for long periods of time, has since stopped working out and running; Quality: Aching pain with intermittent sharp pains depending on position/activity Radiation: to right lateral hip and into the right groin at times Severity: 4/10 constantly but can increase to 6/10 after sitting for long periods of time Timing: constant  No bowel/bladder changes. No groin numbness.  Ambulatory

## 2025-05-13 ENCOUNTER — EMERGENCY (EMERGENCY)
Facility: HOSPITAL | Age: 55
LOS: 0 days | Discharge: HOME | End: 2025-05-13
Attending: STUDENT IN AN ORGANIZED HEALTH CARE EDUCATION/TRAINING PROGRAM
Payer: COMMERCIAL

## 2025-05-13 VITALS
HEART RATE: 73 BPM | TEMPERATURE: 98 F | SYSTOLIC BLOOD PRESSURE: 129 MMHG | RESPIRATION RATE: 18 BRPM | DIASTOLIC BLOOD PRESSURE: 91 MMHG | OXYGEN SATURATION: 100 %

## 2025-05-13 VITALS — WEIGHT: 162.26 LBS
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
## 2025-05-13 DIAGNOSIS — R07.81 PLEURODYNIA: ICD-10-CM
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
## 2025-05-13 PROCEDURE — 99284 EMERGENCY DEPT VISIT MOD MDM: CPT | Mod: 25

## 2025-05-13 PROCEDURE — 71250 CT THORAX DX C-: CPT

## 2025-05-13 PROCEDURE — 99284 EMERGENCY DEPT VISIT MOD MDM: CPT

## 2025-05-13 PROCEDURE — 71250 CT THORAX DX C-: CPT | Mod: 26

## 2025-05-13 RX ORDER — LIDOCAINE HYDROCHLORIDE 20 MG/ML
1 JELLY TOPICAL ONCE
Refills: 0 | Status: COMPLETED | OUTPATIENT
Start: 2025-05-13 | End: 2025-05-13

## 2025-05-13 RX ORDER — IBUPROFEN 200 MG
600 TABLET ORAL ONCE
Refills: 0 | Status: COMPLETED | OUTPATIENT
Start: 2025-05-13 | End: 2025-05-13

## 2025-05-13 RX ADMIN — Medication 600 MILLIGRAM(S): at 19:34

## 2025-05-13 RX ADMIN — LIDOCAINE HYDROCHLORIDE 1 PATCH: 20 JELLY TOPICAL at 19:34

## 2025-05-13 NOTE — ED PROVIDER NOTE - PROGRESS NOTE DETAILS
No acute fracture on CT.  Discussed return precautions and all questions answered. Pt in agreement w/ plan. Patient demonstrates decision making capacity, NAD, VSS. Stable for d/c.

## 2025-05-13 NOTE — ED ADULT NURSE NOTE - NSFALLUNIVINTERV_ED_ALL_ED
Bed/Stretcher in lowest position, wheels locked, appropriate side rails in place/Physically safe environment - no spills, clutter or unnecessary equipment

## 2025-05-13 NOTE — ED PROVIDER NOTE - CLINICAL SUMMARY MEDICAL DECISION MAKING FREE TEXT BOX
55-year-old male presenting with left-sided rib pain after falling forward against the tub.  Has lower rib tenderness concerning for underlying fracture versus contusion.  Lungs clear, no crepitus, doubt pneumothorax or flail segment.  Plan for pain control, CT rule out fracture or pneumothorax, anticipate discharge if workup negative.

## 2025-05-13 NOTE — ED ADULT TRIAGE NOTE - CHIEF COMPLAINT QUOTE
fall 6 days ago  c/o left rib pain. as per pt " I was holding dog from tub he squirmed I lost footing and fell on my left side. pain is getting worse. o2 100% HR 89. denies any significant pmh.

## 2025-05-13 NOTE — ED PROVIDER NOTE - OBJECTIVE STATEMENT
55-year-old male no PMH presents to the emergency department for injuries from a fall.  Patient states he was cleaning his dog in the bathtub and when he went to remove them, dog wiggled causing him to fall forward striking his ribs on the side of the tub.  Pain has been gradually worsening.  Took Tylenol prior to arrival without improvement.  He complains of pain with movement and deep inspiration.

## 2025-05-13 NOTE — ED PROVIDER NOTE - NSFOLLOWUPINSTRUCTIONS_ED_ALL_ED_FT
You were seen in the emergency department for injuries from a fall.  Pain control is the most important in this situation. Take Tylenol 650-1000 mg every 4-6 hours as needed for pain. Do not exceed 4,000 mg in a 24 hour period. Take Ibuprofen 600-800 mg every 8 hours as needed for moderate pain -- take with food.     Rest, drink plenty of fluids.  Advance activity as tolerated.  Continue all previously prescribed medications as directed.  Follow up with your primary care physician in 48-72 hours- bring copies of your results.  Return to the ER for worsening or persistent symptoms, and/or ANY NEW OR CONCERNING SYMPTOMS. If you have issues obtaining follow up, please call: [phone] to obtain a doctor or specialist who takes your insurance in your area.

## 2025-05-13 NOTE — ED PROVIDER NOTE - PATIENT PORTAL LINK FT
You can access the FollowMyHealth Patient Portal offered by [facility] by registering at the following website: [email or web address]. By joining [employer]’s FollowMyHealth portal, you will also be able to view your health information using other applications (apps) compatible with our system.

## 2025-05-13 NOTE — ED ADULT NURSE NOTE - OBJECTIVE STATEMENT
Pt presents to the ED w c/o L sided rib pain s/p fall 6 days ago. Pt is A&Ox3 and ambulatory at baseline. Reports that he fell on his L side after taking his dog out of the bath and his dog moved around in his arms. Denies SOB at this time. PTA pt took tylenol at 1700.

## 2025-05-13 NOTE — ED PROVIDER NOTE - PHYSICAL EXAMINATION
GENERAL: A&Ox4, non-toxic appearing, no acute distress  HEENT: NCAT, EOMI, oral mucosa moist, normal conjunctiva  RESP: no respiratory distress, CTAB, no wheezes/rhonchi/rales, speaking in full sentences  CV: RRR, no murmurs/rubs/gallops  ABDOMEN: soft, non-tender, non-distended, no guarding, no rebound tenderness  MSK: no visible deformities, left anterior chest wall tenderness overlying ribs 7–10 without crepitus  NEURO: no focal sensory or motor deficits, CN 2-12 grossly intact  SKIN: warm, normal color, well perfused, no rash  PSYCH: normal affect

## 2025-05-23 ENCOUNTER — EMERGENCY (EMERGENCY)
Facility: HOSPITAL | Age: 55
LOS: 0 days | Discharge: HOME | End: 2025-05-23
Attending: EMERGENCY MEDICINE
Payer: COMMERCIAL

## 2025-05-23 VITALS
WEIGHT: 163.58 LBS | HEART RATE: 87 BPM | TEMPERATURE: 98 F | HEIGHT: 65 IN | DIASTOLIC BLOOD PRESSURE: 82 MMHG | SYSTOLIC BLOOD PRESSURE: 113 MMHG | RESPIRATION RATE: 16 BRPM | OXYGEN SATURATION: 99 %

## 2025-05-23 VITALS
OXYGEN SATURATION: 100 % | RESPIRATION RATE: 16 BRPM | SYSTOLIC BLOOD PRESSURE: 127 MMHG | HEART RATE: 68 BPM | TEMPERATURE: 98 F | DIASTOLIC BLOOD PRESSURE: 83 MMHG

## 2025-05-23 DIAGNOSIS — R91.1 SOLITARY PULMONARY NODULE: ICD-10-CM

## 2025-05-23 DIAGNOSIS — K76.89 OTHER SPECIFIED DISEASES OF LIVER: ICD-10-CM

## 2025-05-23 PROBLEM — Z78.9 OTHER SPECIFIED HEALTH STATUS: Chronic | Status: ACTIVE | Noted: 2025-05-13

## 2025-05-23 LAB
ALBUMIN SERPL ELPH-MCNC: 4 G/DL — SIGNIFICANT CHANGE UP (ref 3.3–5)
ALP SERPL-CCNC: 64 U/L — SIGNIFICANT CHANGE UP (ref 40–120)
ALT FLD-CCNC: 28 U/L — SIGNIFICANT CHANGE UP (ref 12–78)
ANION GAP SERPL CALC-SCNC: 3 MMOL/L — LOW (ref 5–17)
AST SERPL-CCNC: 28 U/L — SIGNIFICANT CHANGE UP (ref 15–37)
BASOPHILS # BLD AUTO: 0.04 K/UL — SIGNIFICANT CHANGE UP (ref 0–0.2)
BASOPHILS NFR BLD AUTO: 0.6 % — SIGNIFICANT CHANGE UP (ref 0–2)
BILIRUB SERPL-MCNC: 0.8 MG/DL — SIGNIFICANT CHANGE UP (ref 0.2–1.2)
BUN SERPL-MCNC: 14 MG/DL — SIGNIFICANT CHANGE UP (ref 7–23)
CALCIUM SERPL-MCNC: 10.1 MG/DL — SIGNIFICANT CHANGE UP (ref 8.5–10.1)
CHLORIDE SERPL-SCNC: 106 MMOL/L — SIGNIFICANT CHANGE UP (ref 96–108)
CO2 SERPL-SCNC: 28 MMOL/L — SIGNIFICANT CHANGE UP (ref 22–31)
CREAT SERPL-MCNC: 0.96 MG/DL — SIGNIFICANT CHANGE UP (ref 0.5–1.3)
EGFR: 92 ML/MIN/1.73M2 — SIGNIFICANT CHANGE UP
EGFR: 92 ML/MIN/1.73M2 — SIGNIFICANT CHANGE UP
EOSINOPHIL # BLD AUTO: 0.12 K/UL — SIGNIFICANT CHANGE UP (ref 0–0.5)
EOSINOPHIL NFR BLD AUTO: 1.9 % — SIGNIFICANT CHANGE UP (ref 0–6)
GLUCOSE SERPL-MCNC: 92 MG/DL — SIGNIFICANT CHANGE UP (ref 70–99)
HCT VFR BLD CALC: 43.7 % — SIGNIFICANT CHANGE UP (ref 39–50)
HGB BLD-MCNC: 15.1 G/DL — SIGNIFICANT CHANGE UP (ref 13–17)
IMM GRANULOCYTES # BLD AUTO: 0.01 K/UL — SIGNIFICANT CHANGE UP (ref 0–0.07)
IMM GRANULOCYTES NFR BLD AUTO: 0.2 % — SIGNIFICANT CHANGE UP (ref 0–0.9)
LIDOCAIN IGE QN: 46 U/L — SIGNIFICANT CHANGE UP (ref 13–75)
LYMPHOCYTES # BLD AUTO: 2 K/UL — SIGNIFICANT CHANGE UP (ref 1–3.3)
LYMPHOCYTES NFR BLD AUTO: 31.6 % — SIGNIFICANT CHANGE UP (ref 13–44)
MCHC RBC-ENTMCNC: 31.4 PG — SIGNIFICANT CHANGE UP (ref 27–34)
MCHC RBC-ENTMCNC: 34.6 G/DL — SIGNIFICANT CHANGE UP (ref 32–36)
MCV RBC AUTO: 90.9 FL — SIGNIFICANT CHANGE UP (ref 80–100)
MONOCYTES # BLD AUTO: 0.49 K/UL — SIGNIFICANT CHANGE UP (ref 0–0.9)
MONOCYTES NFR BLD AUTO: 7.7 % — SIGNIFICANT CHANGE UP (ref 2–14)
NEUTROPHILS # BLD AUTO: 3.67 K/UL — SIGNIFICANT CHANGE UP (ref 1.8–7.4)
NEUTROPHILS NFR BLD AUTO: 58 % — SIGNIFICANT CHANGE UP (ref 43–77)
NRBC # BLD AUTO: 0 K/UL — SIGNIFICANT CHANGE UP (ref 0–0)
NRBC # FLD: 0 K/UL — SIGNIFICANT CHANGE UP (ref 0–0)
NRBC BLD AUTO-RTO: 0 /100 WBCS — SIGNIFICANT CHANGE UP (ref 0–0)
PLATELET # BLD AUTO: 215 K/UL — SIGNIFICANT CHANGE UP (ref 150–400)
PMV BLD: 8.5 FL — SIGNIFICANT CHANGE UP (ref 7–13)
POTASSIUM SERPL-MCNC: 3.9 MMOL/L — SIGNIFICANT CHANGE UP (ref 3.5–5.3)
POTASSIUM SERPL-SCNC: 3.9 MMOL/L — SIGNIFICANT CHANGE UP (ref 3.5–5.3)
PROT SERPL-MCNC: 7.4 GM/DL — SIGNIFICANT CHANGE UP (ref 6–8.3)
RBC # BLD: 4.81 M/UL — SIGNIFICANT CHANGE UP (ref 4.2–5.8)
RBC # FLD: 11.9 % — SIGNIFICANT CHANGE UP (ref 10.3–14.5)
SODIUM SERPL-SCNC: 137 MMOL/L — SIGNIFICANT CHANGE UP (ref 135–145)
WBC # BLD: 6.33 K/UL — SIGNIFICANT CHANGE UP (ref 3.8–10.5)
WBC # FLD AUTO: 6.33 K/UL — SIGNIFICANT CHANGE UP (ref 3.8–10.5)

## 2025-05-23 PROCEDURE — 74177 CT ABD & PELVIS W/CONTRAST: CPT | Mod: 26

## 2025-05-23 PROCEDURE — 74177 CT ABD & PELVIS W/CONTRAST: CPT

## 2025-05-23 PROCEDURE — 83690 ASSAY OF LIPASE: CPT

## 2025-05-23 PROCEDURE — 99284 EMERGENCY DEPT VISIT MOD MDM: CPT | Mod: 25

## 2025-05-23 PROCEDURE — 99285 EMERGENCY DEPT VISIT HI MDM: CPT

## 2025-05-23 PROCEDURE — 80053 COMPREHEN METABOLIC PANEL: CPT

## 2025-05-23 PROCEDURE — 96374 THER/PROPH/DIAG INJ IV PUSH: CPT | Mod: XU

## 2025-05-23 PROCEDURE — 85025 COMPLETE CBC W/AUTO DIFF WBC: CPT

## 2025-05-23 PROCEDURE — 36415 COLL VENOUS BLD VENIPUNCTURE: CPT

## 2025-05-23 RX ORDER — ACETAMINOPHEN 500 MG/5ML
1000 LIQUID (ML) ORAL ONCE
Refills: 0 | Status: COMPLETED | OUTPATIENT
Start: 2025-05-23 | End: 2025-05-23

## 2025-05-23 RX ADMIN — Medication 400 MILLIGRAM(S): at 15:59

## 2025-05-23 NOTE — ED ADULT TRIAGE NOTE - BIRTH SEX
Pt received from [facility]. Pt is A&Ox4 and pleasant. Pt denies having any additional complaints at this time. VSS. Plan of care discussed with pt. Male

## 2025-05-23 NOTE — ED STATDOCS - PHYSICAL EXAMINATION
Constitutional: NAD AAOx3  Eyes: PERRLA EOMI  Head: Normocephalic atraumatic  Mouth: MMM  Cardiac: regular rate   Resp: unlabored breathing  GI: left mid abdominal TTP, mild left flank TTP   Neuro: grossly normal and intact  Skin: No visible rashes  MSK:  no midline spinal TTP, no bruising noticed , no tenderness to the chest wall. Constitutional: NAD AAOx3  Eyes: PERRLA EOMI  Head: Normocephalic atraumatic  Mouth: MMM  Cardiac: regular rate   Resp: unlabored breathing clear b/l   GI: left mid abdominal TTP, mild left flank TTP   Neuro: grossly normal and intact  Skin: No visible rashes  MSK:  no midline spinal TTP, no bruising noticed , no tenderness to the chest wall.

## 2025-05-23 NOTE — ED STATDOCS - ATTENDING APP SHARED VISIT CONTRIBUTION OF CARE
I, Paul Courtney MD, personally saw the patient with ACP.  I have personally performed a face to face diagnostic evaluation on this patient.   The initial assessment was performed by myself and then the patient was handed off to the ACP. The patient was followed and re-evaluated by the ACP. All labs, imaging and procedures were evaluated and performed by the ACP and I was available for consultation if any questions in the patients care came up.   I personally made/approved the management plan and take responsibility for the patient management.

## 2025-05-23 NOTE — ED STATDOCS - CARE PROVIDER_API CALL
Greg Painting  Internal Medicine  [address] [place] [address]  Phone: [phone]  Fax: [phone]  Follow Up Time:     Will Manuel  Gastroenterology  [address] [place] [id number]  Phone: [phone]  Follow Up Time:

## 2025-05-23 NOTE — ED STATDOCS - NS_EDPROVIDERDISPOUSERTYPE_ED_A_ED
Scribe Attestation (For Scribes USE Only)... Attending Attestation (For Attendings USE Only).../Scribe Attestation (For Scribes USE Only)... Fall with Harm Risk

## 2025-05-23 NOTE — ED STATDOCS - CLINICAL SUMMARY MEDICAL DECISION MAKING FREE TEXT BOX
55-year-old male presents to the emergency department with left-sided abdominal pain.  Patient states that he had a slip and fall about a week ago where he landed on his left flank.  Came to the emergency department had a CT of his chest Noncon which was negative.  States that symptoms have not improved now he is having abdominal pain on the left side going towards the middle of his abdomen he feels bloated no nausea or vomiting.  Tried taking Tylenol over the last few days with minimal help has not taken Tylenol today.  Exam with left mid abdominal tenderness to palpation mild left flank tenderness to palpation no midline spinal tenderness to palpation no bruising noticed no tenderness to the chest wall.  Given fall now with worsening abdominal pain will image check labs symptom control reassess.

## 2025-05-23 NOTE — ED ADULT NURSE NOTE - NSFALLRISKINTERV_ED_ALL_ED

## 2025-05-23 NOTE — ED STATDOCS - PATIENT PORTAL LINK FT
You can access the FollowMyHealth Patient Portal offered by [facility] by registering at the following website: [email or web address]. By joining [employer]’s FollowMyHealth portal, you will also be able to view your health information using other applications (apps) compatible with our system.

## 2025-05-23 NOTE — ED ADULT TRIAGE NOTE - CHIEF COMPLAINT QUOTE
Pt presents to ed for LUQ pain and bloating. Endorses nausea. Was here last week for a fall to same area, imaging showed no broke ribs. Pt A&ox4, ambulatory, no s/s of distress. - meds pta, - allergies.

## 2025-05-23 NOTE — ED STATDOCS - OBJECTIVE STATEMENT
55-year-old male presents to the emergency department with left-sided abdominal pain.  Patient states that he had a slip and fall about a week ago where he landed on his left flank.  Came to the emergency department had a CT of his chest Noncon which was negative.  States that symptoms have not improved now he is having abdominal pain on the left side going towards the middle of his abdomen he feels bloated no nausea or vomiting.  Tried taking Tylenol over the last few days with minimal help has not taken Tylenol today.

## 2025-05-23 NOTE — ED ADULT NURSE NOTE - OBJECTIVE STATEMENT
Pt is 56 y/o male who presents to the ED with c/o left side rib pain x 2 weeks worsening today. Pt states he slipped and fell trying to get his dog out of the shower, landing on left side. Pt was seen and evaluated - ct scan and sent home. Pt states he has been complaint with Tylenol and ibuprofen but symptoms have not gotten better. Pt is now experiencing bolting. Pt is 54 y/o male who presents to the ED with c/o left side rib pain x 2 weeks worsening today. Pt states he slipped and fell trying to get his dog out of the shower, landing on left side. Pt was seen and evaluated - ct scan and sent home. Pt states he has been complaint with Tylenol and ibuprofen but symptoms have not gotten better. Pt is now experiencing bloating.

## 2025-05-23 NOTE — ED STATDOCS - PROGRESS NOTE DETAILS
55-year-old male presents to the emergency department with left-sided abdominal pain.  Patient states that he had a slip and fall about a week ago where he landed on his left flank.  Came to the emergency department had a CT of his chest Noncon which was negative.  States that symptoms have not improved now he is having abdominal pain on the left side going towards the middle of his abdomen he feels bloated no nausea or vomiting.  Tried taking Tylenol over the last few days with minimal help has not taken Tylenol today. Pain worsening over the last few days.  Tylenol taken without relief.  Plan for labs, CT.  Ling Priest PA-C Pain worsening over the last few days.  Tylenol taken without relief.  Plan for labs, CT.  No new trauma.   Ling Priest PA-C CT negative f or acute findings.  RLL lung nodule.  Liver cysts.  Labs unremarkable.  Pt. to continue with Tylenol and return for any worsening symptoms or concerns.  Labs discussed.   Pt. to follow with PMD. CT negative f or acute findings.  RLL lung nodule.  Liver cysts.  Discussed need for outpatient follow up for nodule and liver cysts.   Labs unremarkable.  Pt. to continue with Tylenol and return for any worsening symptoms or concerns.  Labs discussed.   Pt. to follow with PMD.

## 2025-05-23 NOTE — ED STATDOCS - NSFOLLOWUPINSTRUCTIONS_ED_ALL_ED_FT
Acute Abdominal Pain    WHAT YOU NEED TO KNOW:    The cause of your abdominal pain may not be found. If a cause is found, treatment will depend on what the cause is.     DISCHARGE INSTRUCTIONS:    Return to the emergency department if:     You vomit blood or cannot stop vomiting.      You have blood in your bowel movement or it looks like tar.       You have bleeding from your rectum.       Your abdomen is larger than usual, more painful, and hard.       You have severe pain in your abdomen.       You stop passing gas and having bowel movements.       You feel weak, dizzy, or faint.    Contact your healthcare provider if:     You have a fever.      You have new signs and symptoms.      Your symptoms do not get better with treatment.       You have questions or concerns about your condition or care.    Medicines may be given to decrease pain, treat an infection, and manage your symptoms. Take your medicine as directed. Call your healthcare provider if you think your medicine is not helping or if you have side effects. Tell him if you are allergic to any medicine. Keep a list of the medicines, vitamins, and herbs you take. Include the amounts, and when and why you take them. Bring the list or the pill bottles to follow-up visits. Carry your medicine list with you in case of an emergency.    Manage your symptoms:     Apply heat on your abdomen for 20 to 30 minutes every 2 hours for as many days as directed. Heat helps decrease pain and muscle spasms.       Manage your stress. Stress may cause abdominal pain. Your healthcare provider may recommend relaxation techniques and deep breathing exercises to help decrease your stress. Your healthcare provider may recommend you talk to someone about your stress or anxiety, such as a counselor or a trusted friend. Get plenty of sleep and exercise regularly.       Limit or do not drink alcohol. Alcohol can make your abdominal pain worse. Ask your healthcare provider if it is safe for you to drink alcohol. Also ask how much is safe for you to drink.       Do not smoke. Nicotine and other chemicals in cigarettes can damage your esophagus and stomach. Ask your healthcare provider for information if you currently smoke and need help to quit. E-cigarettes or smokeless tobacco still contain nicotine. Talk to your healthcare provider before you use these products.     Make changes to the food you eat as directed: Do not eat foods that cause abdominal pain or other symptoms. Eat small meals more often.     Eat more high-fiber foods if you are constipated. High-fiber foods include fruits, vegetables, whole-grain foods, and legumes.       Do not eat foods that cause gas if you have bloating. Examples include broccoli, cabbage, and cauliflower. Do not drink soda or carbonated drinks, because these may also cause gas.       Do not eat foods or drinks that contain sorbitol or fructose if you have diarrhea and bloating. Some examples are fruit juices, candy, jelly, and sugar-free gum.       Do not eat high-fat foods, such as fried foods, cheeseburgers, hot dogs, and desserts.      Limit or do not drink caffeine. Caffeine may make symptoms, such as heart burn or nausea, worse.       Drink plenty of liquids to prevent dehydration from diarrhea or vomiting. Ask your healthcare provider how much liquid to drink each day and which liquids are best for you.     Follow up with your healthcare provider as directed: Write down your questions so you remember to ask them during your visits. FOLLOW UP WITH YOUR PRIMARY DOCTOR FOR REPEAT CAT SCAN DUE TO LUNG NODULES.  THIS WILL HAVE TO BE MONITORED YEARLY.        Acute Abdominal Pain    WHAT YOU NEED TO KNOW:    The cause of your abdominal pain may not be found. If a cause is found, treatment will depend on what the cause is.     DISCHARGE INSTRUCTIONS:    Return to the emergency department if:     You vomit blood or cannot stop vomiting.      You have blood in your bowel movement or it looks like tar.       You have bleeding from your rectum.       Your abdomen is larger than usual, more painful, and hard.       You have severe pain in your abdomen.       You stop passing gas and having bowel movements.       You feel weak, dizzy, or faint.    Contact your healthcare provider if:     You have a fever.      You have new signs and symptoms.      Your symptoms do not get better with treatment.       You have questions or concerns about your condition or care.    Medicines may be given to decrease pain, treat an infection, and manage your symptoms. Take your medicine as directed. Call your healthcare provider if you think your medicine is not helping or if you have side effects. Tell him if you are allergic to any medicine. Keep a list of the medicines, vitamins, and herbs you take. Include the amounts, and when and why you take them. Bring the list or the pill bottles to follow-up visits. Carry your medicine list with you in case of an emergency.    Manage your symptoms:     Apply heat on your abdomen for 20 to 30 minutes every 2 hours for as many days as directed. Heat helps decrease pain and muscle spasms.       Manage your stress. Stress may cause abdominal pain. Your healthcare provider may recommend relaxation techniques and deep breathing exercises to help decrease your stress. Your healthcare provider may recommend you talk to someone about your stress or anxiety, such as a counselor or a trusted friend. Get plenty of sleep and exercise regularly.       Limit or do not drink alcohol. Alcohol can make your abdominal pain worse. Ask your healthcare provider if it is safe for you to drink alcohol. Also ask how much is safe for you to drink.       Do not smoke. Nicotine and other chemicals in cigarettes can damage your esophagus and stomach. Ask your healthcare provider for information if you currently smoke and need help to quit. E-cigarettes or smokeless tobacco still contain nicotine. Talk to your healthcare provider before you use these products.     Make changes to the food you eat as directed: Do not eat foods that cause abdominal pain or other symptoms. Eat small meals more often.     Eat more high-fiber foods if you are constipated. High-fiber foods include fruits, vegetables, whole-grain foods, and legumes.       Do not eat foods that cause gas if you have bloating. Examples include broccoli, cabbage, and cauliflower. Do not drink soda or carbonated drinks, because these may also cause gas.       Do not eat foods or drinks that contain sorbitol or fructose if you have diarrhea and bloating. Some examples are fruit juices, candy, jelly, and sugar-free gum.       Do not eat high-fat foods, such as fried foods, cheeseburgers, hot dogs, and desserts.      Limit or do not drink caffeine. Caffeine may make symptoms, such as heart burn or nausea, worse.       Drink plenty of liquids to prevent dehydration from diarrhea or vomiting. Ask your healthcare provider how much liquid to drink each day and which liquids are best for you.     Follow up with your healthcare provider as directed: Write down your questions so you remember to ask them during your visits.